# Patient Record
(demographics unavailable — no encounter records)

---

## 2025-04-15 NOTE — ASSESSMENT
[FreeTextEntry1] : MAHNAZ JACOBSON has mild HFSNHL with mild aytsmetry and normal tymps.  She si having TMJ/cervcial spasm. I suggested a soft diet, chewing evenly and avoiding nuts and gum. I also suggested heat and gentle massage to trapezius muscle. I also suggested an OTC ([place]) grinding guard. She will call me in 2 weeks. She may need PT.

## 2025-04-15 NOTE — HISTORY OF PRESENT ILLNESS
[de-identified] : MAHNAZ JACOBSON  is a 70 year woman with a history of serval months of pressure AD.She denies tinnitus and feels her hearing is good.

## 2025-04-15 NOTE — PHYSICAL EXAM
[TextEntry] : PHYSICAL EXAM  General: The patient was alert and oriented and in no distress. Voice was normal.   Face: The patient had no facial asymmetry or mass. The skin was unremarkable.  Ears: External ears were normal without deformity. Ear canals were normal. Tympanic membranes were intact and normal. No perforation or effusion  Nose:  The external nose had no significant deformity.  There was no facial tenderness.  On anterior rhinoscopy, the nasal mucosa was normal.  The anterior septum was normal. There were no visualized polyps purulence  or masses.  Oral cavity: The oral mucosa was normal. The oral and base of tongue were clear and without mass. The gingival and buccal mucosa were moist and without lesions. The palate moved well. There was no cleft palate. There appeared to be good salivary flow.   There was no pus, erythema or mass in the oral cavity/oropharynx.  Neck:  The neck was symmetrical. The parotid and submandibular glands were normal without masses. The trachea was midline and there was no unusual crepitus. Thyroid was smooth and nontender and no masses were palpated. Cervical adenopathy- none. The right TMJ was in spasm and tender. The right  trapezius muscle was tender with probable trigger point.

## 2025-04-15 NOTE — CONSULT LETTER
[Dear  ___] : Dear  [unfilled], [Consult Letter:] : I had the pleasure of evaluating your patient, [unfilled]. [Please see my note below.] : Please see my note below. [Sincerely,] : Sincerely, [FreeTextEntry3] : Donnie Goldstein MD

## 2025-04-15 NOTE — REASON FOR VISIT
[Initial Evaluation] : an initial evaluation for [Ear Pain] : ear pain [FreeTextEntry2] : stuffy right ear